# Patient Record
Sex: MALE | NOT HISPANIC OR LATINO | URBAN - METROPOLITAN AREA
[De-identification: names, ages, dates, MRNs, and addresses within clinical notes are randomized per-mention and may not be internally consistent; named-entity substitution may affect disease eponyms.]

---

## 2017-05-04 ENCOUNTER — IMPORTED ENCOUNTER (OUTPATIENT)
Dept: URBAN - METROPOLITAN AREA CLINIC 1 | Facility: CLINIC | Age: 62
End: 2017-05-04

## 2017-05-04 PROBLEM — H17.822: Noted: 2017-05-04

## 2017-05-04 PROBLEM — H25.813: Noted: 2017-05-04

## 2017-05-04 PROBLEM — H04.123: Noted: 2017-05-04

## 2017-05-04 PROCEDURE — 92015 DETERMINE REFRACTIVE STATE: CPT

## 2017-05-04 PROCEDURE — 92014 COMPRE OPH EXAM EST PT 1/>: CPT

## 2017-05-04 NOTE — PATIENT DISCUSSION
1.  Cataract OU- Visually Significant secondary to glare pt defers sx at this time2. Dry Eyes OU- Stable. The continuation of artificial tears OU BID were recommended. 3.  Peripheral Corneal Scar OS- Observe. 4.  Return for an appointment for a 30/glare in 1 year with Dr. Lily Faulkner.

## 2018-05-03 ENCOUNTER — IMPORTED ENCOUNTER (OUTPATIENT)
Dept: URBAN - METROPOLITAN AREA CLINIC 1 | Facility: CLINIC | Age: 63
End: 2018-05-03

## 2018-05-03 PROBLEM — H25.813: Noted: 2018-05-03

## 2018-05-03 PROBLEM — H17.822: Noted: 2018-05-03

## 2018-05-03 PROBLEM — H04.123: Noted: 2018-05-03

## 2018-05-03 PROCEDURE — 92014 COMPRE OPH EXAM EST PT 1/>: CPT

## 2018-05-03 PROCEDURE — 92015 DETERMINE REFRACTIVE STATE: CPT

## 2018-05-03 NOTE — PATIENT DISCUSSION
1.  Cataract OU:  Visually Significant secondary to glare OU. Observe w/o intervention at this time. 2. Peripheral Corneal Scar OS- Stable. 3. Dry Eyes OU- Controlled. The continuation of artificial tears were recommended. 4.  Return for an appointment for a 30/glare in 1 year with Dr. Ronal Villa.

## 2019-05-03 ENCOUNTER — IMPORTED ENCOUNTER (OUTPATIENT)
Dept: URBAN - METROPOLITAN AREA CLINIC 1 | Facility: CLINIC | Age: 64
End: 2019-05-03

## 2019-05-03 PROBLEM — H25.813: Noted: 2019-05-03

## 2019-05-03 PROBLEM — H17.822: Noted: 2019-05-03

## 2019-05-03 PROBLEM — H04.123: Noted: 2019-05-03

## 2019-05-03 PROCEDURE — 92014 COMPRE OPH EXAM EST PT 1/>: CPT

## 2019-05-03 PROCEDURE — 92015 DETERMINE REFRACTIVE STATE: CPT

## 2019-05-03 NOTE — PATIENT DISCUSSION
1.  Cataract OU: Observe for now without intervention. The patient was advised to contact us if any change or worsening of vision2. Dry Eyes OU - Recommend ATs TID OU routinely 3. Peripheral Corneal Scar OS - StableMRX for glasses givenReturn for an appointment in 1 year 30/glare with Dr. Janett Francisco.

## 2022-04-02 ASSESSMENT — KERATOMETRY
OD_K1POWER_DIOPTERS: 43.00
OS_AXISANGLE2_DEGREES: 075
OS_K1POWER_DIOPTERS: 43.25
OD_AXISANGLE2_DEGREES: 091
OS_AXISANGLE_DEGREES: 148
OD_AXISANGLE_DEGREES: 180
OS_K2POWER_DIOPTERS: 43.75
OD_K2POWER_DIOPTERS: 42.50

## 2022-04-02 ASSESSMENT — VISUAL ACUITY
OS_GLARE: 20/60
OD_CC: J2
OS_CC: J2
OS_CC: 20/30
OS_CC: 20/30
OS_CC: 20/20
OD_GLARE: 20/80
OS_GLARE: 20/60
OD_CC: 20/50
OD_GLARE: 20/80
OD_CC: 20/40-1
OD_CC: 20/50
OS_GLARE: 20/50
OD_GLARE: 20/80

## 2022-04-02 ASSESSMENT — TONOMETRY
OD_IOP_MMHG: 15
OD_IOP_MMHG: 15
OD_IOP_MMHG: 16
OS_IOP_MMHG: 15
OS_IOP_MMHG: 16
OS_IOP_MMHG: 15